# Patient Record
(demographics unavailable — no encounter records)

---

## 2017-10-04 NOTE — EMERGENCY ROOM REPORT
History of Present Illness


General


Chief Complaint:  Skin Rash/Abscess


Source:  Patient





Present Illness


HPI


30-year-old female presents ED for evaluation.  States that since last night she

's noticed any redness and swelling to her right upper arm.  Thinks it may be a 

spider bite.  Denies any pain at this time.  States it is itchy.  Denies any 

fevers or chills.  Denies any discharge.  No other aggravating relieving 

factors.  Denies any other associated symptoms


Allergies:  


Coded Allergies:  


     PENICILLINS (Verified  Allergy, Unknown, 4/13/14)





Patient History


Past Medical History:  asthma


Past Surgical History:  none


Pertinent Family History:  none


Social History:  Denies: smoking, alcohol use, drug use


Pregnant Now:  No


Immunizations:  UTD


Reviewed Nursing Documentation:  PMH: Agreed, PSxH: Agreed





Nursing Documentation-PMH


Hx Cardiac Problems:  No - anemia


Hx Hypertension:  No


Hx Pacemaker:  No


Hx Asthma:  Yes


Hx COPD:  No


Hx Diabetes:  No


Hx Cancer:  No


Hx Gastrointestinal Problems:  No


Hx Dialysis:  No


Hx Neurological Problems:  No


Hx Cerebrovascular Accident:  No


Hx Seizures:  No





Review of Systems


All Other Systems:  negative except mentioned in HPI





Physical Exam





Vital Signs








  Date Time  Temp Pulse Resp B/P (MAP) Pulse Ox O2 Delivery O2 Flow Rate FiO2


 


10/3/17 09:56 97.5 62 20 120/74 100 Room Air  








Sp02 EP Interpretation:  reviewed, normal


General Appearance:  no apparent distress, alert, GCS 15, non-toxic


Head:  normocephalic


Eyes:  bilateral eye normal inspection, bilateral eye PERRL


ENT:  normal ENT inspection


Neck:  normal inspection


Respiratory:  normal inspection


Cardiovascular #1:  normal inspection


Gastrointestinal:  normal inspection


Rectal:  deferred


Genitourinary:  no CVA tenderness


Musculoskeletal:  normal inspection


Neurologic:  alert, oriented x3, responsive, motor strength/tone normal, 

sensory intact, speech normal


Psychiatric:  normal inspection


Skin:  other - 2x3cm area of erythema/induration to R proximal arm. no 

discharge. no fluctuance


Lymphatic:  normal inspection





Medical Decision Making


Diagnostic Impression:  


 Primary Impression:  


 Insect bite


 Qualified Codes:  W57.XXXA - Bitten or stung by nonvenomous insect and other 

nonvenomous arthropods, initial encounter


ER Course


Hospital Course 


30-year-old female presents to ED with redness, swelling to right arm





Differential diagnoses include: Cellulitis, dermatitis, insect bite, abscess





Clinical course


Patient placed on stretcher.  After initial history, physical exam reveals a 

young female in no acute distress.  On exam there is a site for mild erythema 

and induration to the right proximal arm.  There is no fluctuance.  There is no 

tenderness.  Full range of motion is noted in the right shoulder and there is 

no concern for any signs of infection to the joint.  There is no tongue swelling

, no stridor, no signs of impending airway.  





I told patient I cannot say with certainty that this is a spider bite.  

Clinical findings consistent with a insect bite with the possible bacterial 

superinfection. reassurance given





Diagnosis - bug bite 





stable and discharged to home with prescription for Clindamycin.  Instructed to 

followup with PMD.  Instructed return to ED if symptoms recur or worsen





Last Vital Signs








  Date Time  Temp Pulse Resp B/P (MAP) Pulse Ox O2 Delivery O2 Flow Rate FiO2


 


10/3/17 10:22 97.5 61 20 120/74 100 Room Air  








Status:  improved


Disposition:  HOME, SELF-CARE


Condition:  Stable


Scripts


Clindamycin Hcl (CLINDAMYCIN HCL) 300 Mg Capsule


300 MG ORAL THREE TIMES A DAY, #21 CAP


   Prov: JUDITH BLANCAS M.D.         10/3/17


Referrals:  


[facility] ([facility]


Patient Instructions:  Insect Bite, Easy-to-Read











JUDITH BLANCAS M.D. Oct 4, 2017 06:56

## 2018-04-04 NOTE — EMERGENCY ROOM REPORT
History of Present Illness


General


Chief Complaint:  Female Urogenital Problems


Source:  Patient, Medical Record





Present Illness


HPI


30 yo female patient presents to ER complaining of vaginal itching and pain 

since x5 days.


Reports used Weber to remove hair and believes some got into her vaginal canal. 

Reports pain since that time.


Complains of swelling and irritation of outer vagina, reports swelling and 

symptoms have decreased since that time.


Reports washed area thoroughly and repeatedly since that time.


Denies hematuria.


Denies foul smell.


Reports LMP last month, normal for her.


, one surgical , no complications from pregnancy.


Reports no recent sexual activity, recent tested, clean.


Denies fever, chest pain, SOB, abdominal pain.


Allergies:  


Coded Allergies:  


     PENICILLINS (Verified  Allergy, Unknown, 14)





Patient History


Past Medical History:  see triage record


Last Menstrual Period:  3/20/18


Pregnant Now:  No


:  2


Para:  1


Reviewed Nursing Documentation:  PMH: Agreed; PSxH: Agreed





Nursing Documentation-PMH


Hx Cardiac Problems:  No - anemia


Hx Hypertension:  No


Hx Pacemaker:  No


Hx Asthma:  Yes


Hx COPD:  No


Hx Diabetes:  No


Hx Cancer:  No


Hx Gastrointestinal Problems:  No


Hx Dialysis:  No


Hx Neurological Problems:  No


Hx Cerebrovascular Accident:  No


Hx Seizures:  No





Review of Systems


All Other Systems:  negative except mentioned in HPI





Physical Exam





Vital Signs








  Date Time  Temp Pulse Resp B/P (MAP) Pulse Ox O2 Delivery O2 Flow Rate FiO2


 


18 13:33 98.0 74 16 102/67 98 Room Air  





 98.1       








Sp02 EP Interpretation:  reviewed, normal


General Appearance:  well appearing, no apparent distress, alert, GCS 15, non-

toxic


Head:  normocephalic, atraumatic


ENT:  hearing grossly normal, normal pharynx, no angioedema, normal voice, 

uvula midline, moist mucus membranes


Neck:  full range of motion


Respiratory:  lungs clear, normal breath sounds, no rhonchi, no respiratory 

distress, no accessory muscle use, no wheezing, speaking full sentences


Gastrointestinal:  non tender, soft, no mass, non-distended, no guarding, no 

rebound


Genitourinary:  no CVA tenderness


Musculoskeletal:  back normal, digits/nails normal, gait/station normal, normal 

range of motion, non-tender


Neurologic:  alert, oriented x3, responsive, motor strength/tone normal, 

sensory intact


Psychiatric:  mood/affect normal


Skin:  other - external vagina: mild erythema, no edema, no wound, no rash, no 

lesions, no vesicles, no TTP


Lymphatic:  no adenopathy





Medical Decision Making


PA Attestation


Dr. Blackmon  is my supervising Physician whom patient management has been 

discussed with.


Diagnostic Impression:  


 Primary Impression:  


 Urinary tract infection


ER Course


Pt presents to ED c/o urinary symptoms.





DDX considered but are not limited to cystitis, pyelonephritis, contact 

dermatitis.





VITAL SIGNS are WNL, patient is afebrile.





Ordered UA, urine pregnancy.





ER COURSE


UA results show leukocyte esterase and WBCs, patient symptomatic, indicate UTI, 

will treat with abx.


Urine pregnancy negative


Results discussed with patient


Informed patient not to use Weber in areas not specified.


Drink plenty of fluids.


Return to ER for new or worsening of symptoms.


Skin infection does not require topical treatment at this time, return to ER if 

symptoms return or worsen.


Patient is resting comfortably in chair, nontoxic appearing, in no acute 

distress. 





DISCHARGE


-Rx provided for Bactrim





Patient is stable for discharge. Patient resting comfortably, in no acute 

distress, nontoxic appearing, talking without difficulty, smiling and laughing.


Will provide with patient care instructions and any necessary prescriptions. 

Patient understands and agrees to treatment plan.


Patient encouraged to drink plenty of fluids.


Patient to take medication as instructed.


Care plan and follow-up instructions provided.


Patient questions asked and answered.


Reports understanding and agreement to treatment plan.


Patient instructed to follow-up with primary care provider in 3 - 5 days.


ER precautions given. Patient instructed to return to ER immediately for any 

new or worsening of symptoms. Including but not limited to fever, abdominal pain

, intractable vomiting.





Labs








Test


  18


13:49


 


Urine Color Yellow 


 


Urine Appearance Clear 


 


Urine pH 6 (4.5-8.0) 


 


Urine Specific Gravity


  1.020


(1.005-1.035)


 


Urine Protein 1+ (NEGATIVE) 


 


Urine Glucose (UA)


  Negative


(NEGATIVE)


 


Urine Ketones


  Negative


(NEGATIVE)


 


Urine Occult Blood


  Negative


(NEGATIVE)


 


Urine Nitrite


  Negative


(NEGATIVE)


 


Urine Bilirubin


  Negative


(NEGATIVE)


 


Urine Urobilinogen


  Normal MG/DL


(0.0-1.0)


 


Urine Leukocyte Esterase 2+ (NEGATIVE) 


 


Urine RBC


  0-2 /HPF (0 -


2)


 


Urine WBC


  5-10 /HPF (0 -


2)


 


Urine Squamous Epithelial


Cells Moderate /LPF


(NONE/OCC)


 


Urine Bacteria


  Few /HPF


(NONE)


 


Urine HCG, Qualitative


  Negative


(NEGATIVE)











Last Vital Signs








  Date Time  Temp Pulse Resp B/P (MAP) Pulse Ox O2 Delivery O2 Flow Rate FiO2


 


18 13:33 98.0 74 16 102/67 98 Room Air  





 98.1       








Disposition:  HOME, SELF-CARE


Condition:  Stable


Scripts


Trimethoprim/Sulfamethoxazole 160/800* (BACTRIM DS TABLET*) 1 Each Tablet


1 TAB ORAL TWICE A DAY for 7 Days, #14 TAB


   Prov: Omar Lemon         18


Patient Instructions:  Urinary Tract Infection





Additional Instructions:  


Followup with primary care provider in 3 -5 days.


Take medications as directed. 


Patient questions asked and answered.


Do not use Weber in vaginal area.


ER precautions given, patient instructed to return to ER immediately for any 

new or worsening of symptoms.











Omar Lemon 2018 13:49